# Patient Record
Sex: FEMALE | Race: WHITE | Employment: PART TIME | URBAN - METROPOLITAN AREA
[De-identification: names, ages, dates, MRNs, and addresses within clinical notes are randomized per-mention and may not be internally consistent; named-entity substitution may affect disease eponyms.]

---

## 2018-08-27 ENCOUNTER — TELEPHONE (OUTPATIENT)
Dept: CARDIOLOGY | Facility: CLINIC | Age: 23
End: 2018-08-27

## 2018-08-27 DIAGNOSIS — Q23.81 BICUSPID AORTIC VALVE: Primary | ICD-10-CM

## 2018-08-27 NOTE — TELEPHONE ENCOUNTER
New ACHD/ CV Genetics Patient Intake    1.  Patient's cardiac history:  BAV, Trivial aortic regurgitation, mild aortic stenosis    2.  Previous cardiac procedures (heart catherizations, surgeries): No      3.  Patient's previous cardiologist and when last visit was: Dr. Bland (11/23/2015)    4.  Recent testing (Echo, MRI, CT, Stress tests): No    5.  Any present concerns: Continuation of Care    6.  Closet location for patient to be seen ([facility]): [place]    7.  Any recent testing at the [facility]: 2015    8.  Patient's E mail or Fax number to send CRYSTAL: [email or web address]    9.  Update chart with patient's PCP: No      FUTURE VISIT INFORMATION        FUTURE VISIT INFORMATION:    Date:     Time:     Location:      RECORDS REQUESTED FROM:         Clinic name Comments Records Status Imaging Status                                                          RECORDS STATUS

## 2018-10-09 ENCOUNTER — OFFICE VISIT (OUTPATIENT)
Dept: CARDIOLOGY | Facility: CLINIC | Age: 23
End: 2018-10-09
Attending: INTERNAL MEDICINE
Payer: COMMERCIAL

## 2018-10-09 ENCOUNTER — RADIANT APPOINTMENT (OUTPATIENT)
Dept: CARDIOLOGY | Facility: CLINIC | Age: 23
End: 2018-10-09
Payer: COMMERCIAL

## 2018-10-09 VITALS
BODY MASS INDEX: 23.19 KG/M2 | HEIGHT: 68 IN | DIASTOLIC BLOOD PRESSURE: 110 MMHG | WEIGHT: 153 LBS | SYSTOLIC BLOOD PRESSURE: 186 MMHG

## 2018-10-09 DIAGNOSIS — Q23.81 BICUSPID AORTIC VALVE: ICD-10-CM

## 2018-10-09 DIAGNOSIS — Q23.1 CONGENITAL INSUFFICIENCY OF AORTIC VALVE: Primary | ICD-10-CM

## 2018-10-09 LAB
ALBUMIN SERPL-MCNC: 3.6 G/DL (ref 3.4–5)
ALP SERPL-CCNC: 49 U/L (ref 40–150)
ALT SERPL W P-5'-P-CCNC: 18 U/L (ref 0–50)
ANION GAP SERPL CALCULATED.3IONS-SCNC: 6 MMOL/L (ref 3–14)
AST SERPL W P-5'-P-CCNC: 18 U/L (ref 0–45)
BASOPHILS # BLD AUTO: 0 10E9/L (ref 0–0.2)
BASOPHILS NFR BLD AUTO: 0.3 %
BILIRUB SERPL-MCNC: 0.4 MG/DL (ref 0.2–1.3)
BUN SERPL-MCNC: 11 MG/DL (ref 7–30)
CALCIUM SERPL-MCNC: 8.9 MG/DL (ref 8.5–10.1)
CHLORIDE SERPL-SCNC: 106 MMOL/L (ref 94–109)
CHOLEST SERPL-MCNC: 151 MG/DL
CO2 SERPL-SCNC: 26 MMOL/L (ref 20–32)
CREAT SERPL-MCNC: 0.88 MG/DL (ref 0.52–1.04)
DIFFERENTIAL METHOD BLD: ABNORMAL
EOSINOPHIL # BLD AUTO: 0 10E9/L (ref 0–0.7)
EOSINOPHIL NFR BLD AUTO: 1 %
ERYTHROCYTE [DISTWIDTH] IN BLOOD BY AUTOMATED COUNT: 11.1 % (ref 10–15)
GFR SERPL CREATININE-BSD FRML MDRD: 80 ML/MIN/1.7M2
GLUCOSE SERPL-MCNC: 92 MG/DL (ref 70–99)
HCT VFR BLD AUTO: 36.3 % (ref 35–47)
HDLC SERPL-MCNC: 67 MG/DL
HGB BLD-MCNC: 11.8 G/DL (ref 11.7–15.7)
IMM GRANULOCYTES # BLD: 0 10E9/L (ref 0–0.4)
IMM GRANULOCYTES NFR BLD: 0.3 %
LDLC SERPL CALC-MCNC: 53 MG/DL
LYMPHOCYTES # BLD AUTO: 1.5 10E9/L (ref 0.8–5.3)
LYMPHOCYTES NFR BLD AUTO: 39.6 %
MCH RBC QN AUTO: 32 PG (ref 26.5–33)
MCHC RBC AUTO-ENTMCNC: 32.5 G/DL (ref 31.5–36.5)
MCV RBC AUTO: 98 FL (ref 78–100)
MONOCYTES # BLD AUTO: 0.5 10E9/L (ref 0–1.3)
MONOCYTES NFR BLD AUTO: 11.7 %
NEUTROPHILS # BLD AUTO: 1.8 10E9/L (ref 1.6–8.3)
NEUTROPHILS NFR BLD AUTO: 47.1 %
NONHDLC SERPL-MCNC: 84 MG/DL
NRBC # BLD AUTO: 0 10*3/UL
NRBC BLD AUTO-RTO: 0 /100
PLATELET # BLD AUTO: 235 10E9/L (ref 150–450)
POTASSIUM SERPL-SCNC: 3.9 MMOL/L (ref 3.4–5.3)
PROT SERPL-MCNC: 7 G/DL (ref 6.8–8.8)
RBC # BLD AUTO: 3.69 10E12/L (ref 3.8–5.2)
SODIUM SERPL-SCNC: 139 MMOL/L (ref 133–144)
TRIGL SERPL-MCNC: 158 MG/DL
TSH SERPL DL<=0.005 MIU/L-ACNC: 1.93 MU/L (ref 0.4–4)
WBC # BLD AUTO: 3.8 10E9/L (ref 4–11)

## 2018-10-09 PROCEDURE — G0463 HOSPITAL OUTPT CLINIC VISIT: HCPCS

## 2018-10-09 PROCEDURE — 80053 COMPREHEN METABOLIC PANEL: CPT | Performed by: INTERNAL MEDICINE

## 2018-10-09 PROCEDURE — 84443 ASSAY THYROID STIM HORMONE: CPT | Performed by: INTERNAL MEDICINE

## 2018-10-09 PROCEDURE — 36415 COLL VENOUS BLD VENIPUNCTURE: CPT | Performed by: INTERNAL MEDICINE

## 2018-10-09 PROCEDURE — 85025 COMPLETE CBC W/AUTO DIFF WBC: CPT | Performed by: INTERNAL MEDICINE

## 2018-10-09 PROCEDURE — 99205 OFFICE O/P NEW HI 60 MIN: CPT | Mod: GC | Performed by: INTERNAL MEDICINE

## 2018-10-09 PROCEDURE — 80061 LIPID PANEL: CPT | Performed by: INTERNAL MEDICINE

## 2018-10-09 PROCEDURE — 93010 ELECTROCARDIOGRAM REPORT: CPT | Mod: ZP | Performed by: INTERNAL MEDICINE

## 2018-10-09 ASSESSMENT — PAIN SCALES - GENERAL: PAINLEVEL: NO PAIN (0)

## 2018-10-09 NOTE — NURSING NOTE
Chief Complaint   Patient presents with     Consult     heart problem -- 22 yo female with PMH significant for bicuspid aortic valve with mild aortic stenosis presenting for evaluation     Adolph Cantrell CMA at 12:08 PM on 10/9/2018     Medications and vitals reviewed with patient and confirmed.

## 2018-10-09 NOTE — PROGRESS NOTES
CARDIOLOGY NEW OFFICE VISIT    HPI: Margaret Prieto is a 23 year old female with PMHx of Bicupsid Aortic Valve c/b mild aortic stenosis and aortic regurgitation who presents to [facility] care.     Patient         PAST MEDICAL HISTORY:  Past Medical History:   Diagnosis Date     Congenital insufficiency of aortic valve 6/19/2013       CURRENT MEDICATIONS:  Current Outpatient Prescriptions   Medication Sig Dispense Refill     norgestim-eth estrad triphasic (TRI-PREVIFEM) 0.18/0.215/0.25 MG-35 MCG per tablet Take 1 tablet by mouth daily         PAST SURGICAL HISTORY:  No past surgical history on file.    ALLERGIES  Review of patient's allergies indicates no known allergies.    FAMILY HX:  Family History   Problem Relation Age of Onset     Hyperlipidemia Maternal Grandmother      Hyperlipidemia Maternal Grandfather      Hypertension Paternal Grandmother      Hypertension Paternal Grandfather        SOCIAL HX:  Social History     Social History     Marital status: Single     Spouse name: N/A     Number of children: N/A     Years of education: N/A     Social History Main Topics     Smoking status: Never Smoker     Smokeless tobacco: Never Used     Alcohol use Not on file     Drug use: Not on file     Sexual activity: Not on file     Other Topics Concern     Not on file     Social History Narrative       ROS:  Negative unless stated above     VITAL SIGNS:  There were no vitals taken for this visit.  There is no height or weight on file to calculate BMI.  Wt Readings from Last 2 Encounters:   11/23/15 64 kg (141 lb 1.5 oz)   06/19/13 63 kg (138 lb 14.2 oz) (73 %)*     * Growth percentiles are based on [facility] 2-20 Years data.       PHYSICAL EXAM      LABS    Lab Results   Component Value Date    WBC 3.8 10/09/2018     Lab Results   Component Value Date    RBC 3.69 10/09/2018     Lab Results   Component Value Date    HGB 11.8 10/09/2018     Lab Results   Component Value Date    HCT 36.3 10/09/2018     No components found for:  MCT  Lab Results   Component Value Date    MCV 98 10/09/2018     Lab Results   Component Value Date    MCH 32.0 10/09/2018     Lab Results   Component Value Date    MCHC 32.5 10/09/2018     Lab Results   Component Value Date    RDW 11.1 10/09/2018     Lab Results   Component Value Date     10/09/2018      Recent Labs   Lab Test  10/09/18   0939   NA  139   POTASSIUM  3.9   CHLORIDE  106   CO2  26   ANIONGAP  6   GLC  92   BUN  11   CR  0.88   PERLITA  8.9     Recent Labs   Lab Test  10/09/18   0939   CHOL  151   HDL  67   LDL  53   TRIG  158*   NHDL  84        EKG:  ***    ECHO: In process     ASSESSMENT AND PLAN:  Margaret Prieto is a 23 year old female with PMHx of Bicupsid Aortic Valve c/b mild aortic stenosis and aortic regurgitation who presents to establish care.

## 2018-10-09 NOTE — MR AVS SNAPSHOT
After Visit Summary   10/9/2018    Margaret Prieto    MRN: [id number]           Patient Information     Date Of Birth          1995        Visit Information        Provider Department      10/9/2018 12:30 PM Germaine Lemon MD [facility]        Today's Diagnoses     Congenital insufficiency of aortic valve    -  1      Care Instructions    You were seen today in the Adult Congenital and Cardiovascular Genetics Clinic at the [facility].    Cardiology Providers you saw during your visit:  Dr. Germaine Lemon    Diagnosis:  Bicuspid aortic valve    Results:  The results of your echo were discussed with you today    Recommendations:    1.  Continue to eat a heart healthy, low salt diet.  2.  Continue to get 20-30 minutes of aerobic activity, 4-5 days per week.  Examples of aerobic activity include walking, running, swimming, cycling, etc.  3.  Continue to observe good oral hygiene, with regular dental visits.  4. Please have all first degree relatives screened with an echo to check for bicuspid aortic valve - mother, father, siblings and children. We can facilitate these screenings if desired.  Just call our clinic to schedule.   4.  Please have a cardiac MRI/MRA. We will call you with the results.     Cardiac MRI  Magnetic resonance imaging (MRI) is a test that lets your doctor see detailed pictures of the inside of your body. MRI combines the use of strong magnets and radio waves to form an MRI image. A Cardiac MRI will give a detailed image of your heart.    Follow these instructions:  1. Please no eating or drinking 3 hours prior to the procedure.   2. No caffeine, alcohol or tobacco 12 hours prior to the procedure,    During the procedure:  1.Please arrive to the [facility] Waiting Room in the [facility].   2. You will be required to lay flat and follow breath-hold instructions.   3. You will need to remove all metal and answer a safety questionnaire. Please wear clothes  "without metal (snaps and zippers). Please remove any body piercings and hair extensions before you arrive. You will also remove watches, jewelry, hairpins, wallets, dentures, partial dental plates and hearing aids. You may wear contact lenses, and you may be able to wear your rings. We have a safe place to keep your personal items, but it is safer to leave them at home.  4. You will be given IV contrast for this exam.  To prepare:  The day before the exam drink extra fluid - at least six 8oz glasses (unless you are on a fluid restriction per your doctor)     Vitals:    10/09/18 1207 10/09/18 1237 10/09/18 1238 10/09/18 1239   BP: (!) (P) 135/91 121/73 (!) 188/106 (!) 186/110   BP Location: Right arm Left arm Left leg Right leg   Cuff Size: Adult Regular Adult Regular     Pulse: (P) 75      Resp: (P) 18      SpO2: (P) 99%      Weight: 69.4 kg (153 lb)      Height: 1.727 m (5' 8\")          Exercise restrictions:   Yes__X__  No____         If yes, list restrictions:  Must be allowed to rest if fatigued or SOB      Work restrictions:  Yes____  No____         If yes, list restrictions:    FASTING CHOLESTEROL was checked in the last 5 years YES___  NO___   Continue to eat a heart healthy, low salt diet.         ____ Fasting lipid panel order today         ____ No changes in medications          ____ I recommend the following changes in your cholesterol medications.:          ____ Please follow up for cholesterol screening at your primary care physician      Follow-up:  Follow up with Dr. Lemon 1 year after your MRI with an echo.     If you have questions or concerns please contact us at:    Adrien Lugo RN, BSN   Timothy Lopez (Scheduling)  Nurse Coordinator     Clinic   Adult Congenital and CV Genetics Adult Congenital and CV Genetic  [facility]  (P)[phone]    (P) " [phone]  [phone]        For after hours urgent needs, call [phone] and ask to speak to the Adult Congenital Physician on call.  Mention Job Code 0401.    For emergencies call 911.    [facility]  [facility]  Mail Code [id number]  [address] [place]  [address]           Follow-ups after your visit        Additional Services     Adult Congenital and CV Genetics Clinic       Echo prior (appt should be 1 year after her MRI was completed)                  Your next 10 appointments already scheduled     Nov 30, 2018  8:15 AM CST   MR CARDIAC W CONTRAST W FLOW QUANT with UUMR4, UU CV MR NURSE   [facility], [place], [facility] ([facility])    [address] [facility] [address] [phone]           How do I prepare for my exam? (Food and drink instructions) The day before your exam, drink extra fluids at least six 8-ounce glasses (unless your doctor wants you to limit your fluids). Stop all caffeine 12 hours before the test. This includes coffee, tea, soda, chocolate and certain medicines (such as Anacin, Excedrin and NoDoz). Also avoid decaf coffee and tea, as these contain small amounts of caffeine.  Do not eat or drink for 3 hours before your exam. You may drink water and take your morning medicines.  **If you will be receiving sedation or general anesthesia, please see special notes below.**  How do I prepare for my exam? (Other instructions) Take your medicines as usual, unless your doctor tells you not to. If you take Viagra, Levitra or Cialis, stop taking it 48 hours before your test. If you take Aggrenox or dipyridamole (Persantine, Permole), stop taking it 48 hours before your test. If you take Theophylline or Aminophylline, stop taking it 12 hours before your test. If you are diabetic and take oral hypoglycemics, do not take  them on the day of your test.  You may need a blood test (creatinine test) within 30 days of your exam. Follow your doctor s orders if this is arranged before your exam.  If you are very claustrophobic, please let you doctor know. You may get a sedative medicine from your doctor before you arrive. Bring the medicine to the exam. Do not take it at home. Arrive one hour early. Bring someone who can take you home after the test. Your medicine will make you sleepy. After the exam, you may not drive, take a bus or take a taxi by yourself.  **If you will be receiving sedation or general anesthesia, please see special notes below.**  What should I wear: The MRI machine uses a strong magnet. Please wear clothes without metal (snaps, zippers). A sweatsuit works well, or we may give you a hospital gown. Please remove any body piercings and hair extensions before you arrive. You will remove watches, jewelry, hairpins, wallets, dentures, partial dental plates and hearing aids. You may wear contact lenses, and you may be able to wear your rings. We have a safe place to keep your personal items, but it is safer to leave them at home.  How long does the exam take: Most tests take 30 to 60 minutes.  HOWEVER, IF YOUR DOCTOR PRESCRIBES ANESTHESIA please plan on spending four to five hours in the recovery room.  What should I bring:  Bring a list of your current medicines to your exam (including vitamins, minerals and over-the-counter drugs).  Do I need a : **If you will be receiving sedation or general anesthesia, please see special notes below.**  What should I do after the exam: No Restrictions, You may resume normal activities.  What is this test: MRI (magnetic resonance imaging) uses a strong magnet and radio waves to look inside the body. An MRA (magnetic resonance angiogram) does the same thing, but it lets us look at your blood vessels. A computer turns the radio waves into pictures showing cross sections of the body,  much like slices of bread. This helps us see any problems more clearly. You may receive fluid (called  contrast ) before or during your scan. The fluid helps us see the pictures better. We give the fluid through an IV (small needle in your arm).  Who should I call with questions: If you have any questions, please contact your Imaging Department exam site. Directions, parking instructions, and other information is available on our website, [email or web address].  How do I prepare if I m having sedation or anesthesia? **IMPORTANT** THE INSTRUCTIONS BELOW ARE ONLY FOR THOSE PATIENTS WHO HAVE BEEN TOLD THEY WILL RECEIVE SEDATION OR GENERAL ANESTHESIA DURING THEIR MRI PROCEDURE:  IF YOU WILL RECEIVE SEDATION (take medicine to help you relax during your exam): You must get the medicine from your doctor before you arrive. Bring the medicine to the exam. Do not take it at home. Arrive one hour early. Bring someone who can take you home after the test. Your medicine will make you sleepy. After the exam, you may not drive, take a bus or take a taxi by yourself. No eating 8 hours before your exam. You may have clear liquids up until 4 hours before your exam. (Clear liquids include water, clear tea, black coffee and fruit juice without pulp.)  IF YOU WILL RECEIVE ANESTHESIA (be asleep for your exam): Arrive 1 1/2 hours early. Bring someone who can take you home after the test. You may not drive, take a bus or take a taxi by yourself. No eating 8 hours before your exam. You may have clear liquids up until 4 hours before your exam. (Clear liquids include water, clear tea, black coffee and fruit juice without pulp.            Nov 30, 2018 10:00 AM CST   MRA CHEST WITH CONTRAST with UUMR4   [facility], [place], MRI ([facility] [place])    [address] [id number] [phone]           How do I prepare for my exam? (Food and drink instructions) **If you will be  receiving sedation or general anesthesia, please see special notes below.**  How do I prepare for my exam? (Other instructions) Take your medicines as usual, unless your doctor tells you not to. Please remove any body piercings and hair extensions before you arrive. Follow your doctor s orders. If you do not, we may have to postpone your exam. You may or may not receive IV contrast for this exam pending the discretion of the Radiologist.  You do not need to do anything special to prepare. **If you will be receiving sedation or general anesthesia, please see special notes below.**  What should I wear:  The MRI machine uses a strong magnet. Please wear clothes without metal (snaps, zippers). A sweatsuit works well, or we may give you a hospital gown.  How long does the exam take: Most tests take 30 to 60 minutes.  HOWEVER, IF YOUR DOCTOR PRESCRIBES ANESTHESIA please plan on spending four to five hours in the recovery room.  What should I bring: Bring a list of your current medicines to your exam (including vitamins, minerals and over-the-counter drugs). Also bring the results of similar scans you may have had.  Do I need a : **If you will be receiving sedation or general anesthesia, please see special notes below.**  What should I do after the exam? No Restrictions, You may resume normal activities.  What is this test: MRI (magnetic resonance imaging) uses a strong magnet and radio waves to look inside the body. An MRA (magnetic resonance angiogram) does the same thing, but it lets us look at your blood vessels. A computer turns the radio waves into pictures showing cross sections of the body, much like slices of bread. This helps us see any problems more clearly.  Who should I call with questions: Please call the Imaging Department at your exam site with any questions. Directions, parking instructions, and other information is available on our website, [email or web address].  How do I prepare if I m having  sedation or anesthesia? **IMPORTANT** THE INSTRUCTIONS BELOW ARE ONLY FOR THOSE PATIENTS WHO HAVE BEEN TOLD THEY WILL RECEIVE SEDATION OR GENERAL ANESTHESIA DURING THEIR MRI PROCEDURE:  IF YOU WILL RECEIVE SEDATION (take medicine to help you relax during your exam): You must get the medicine from your doctor before you arrive. Bring the medicine to the exam. Do not take it at home. Arrive one hour early. Bring someone who can take you home after the test. Your medicine will make you sleepy. After the exam, you may not drive, take a bus or take a taxi by yourself. No eating 8 hours before your exam. You may have clear liquids up until 4 hours before your exam. (Clear liquids include water, clear tea, black coffee and fruit juice without pulp.)  IF YOU WILL RECEIVE ANESTHESIA (be asleep for your exam): Arrive 1 1/2 hours early. Bring someone who can take you home after the test. You may not drive, take a bus or take a taxi by yourself. No eating 8 hours before your exam. You may have clear liquids up until 4 hours before your exam. (Clear liquids include water, clear tea, black coffee and fruit juice without pulp.) You will spend four to five hours in the recovery room.              Future tests that were ordered for you today     Open Future Orders        Priority Expected Expires Ordered    Adult Congenital and CV Genetics Clinic Routine 10/9/2019 10/9/2019 10/9/2018    Echo Complete Routine 10/9/2019 10/10/2019 10/9/2018    MRI Cardiac w/contrast and flow Routine 11/16/2018 10/10/2019 10/9/2018    MRA Chest with Contrast Routine 11/16/2018 10/10/2019 10/9/2018            Who to contact     If you have questions or need follow up information about today's clinic visit or your schedule please contact [facility] directly at [phone].  Normal or non-critical lab and imaging results will be communicated to you by MyChart, letter or phone within 4 business days after the clinic has received the results. If  "you do not hear from us within 7 days, please contact the clinic through [employer] or phone. If you have a critical or abnormal lab result, we will notify you by phone as soon as possible.  Submit refill requests through [employer] or call your pharmacy and they will forward the refill request to us. Please allow 3 business days for your refill to be completed.          Additional Information About Your Visit        [employer] Information     [employer] gives you secure access to your electronic health record. If you see a primary care provider, you can also send messages to your care team and make appointments. If you have questions, please call your primary care clinic.  If you do not have a primary care provider, please call [phone] and they will assist you.        Care EveryWhere ID     This is your Care EveryWhere ID. This could be used by other organizations to access your [place] medical records  [id number]        Your Vitals Were     Height BMI (Body Mass Index)                1.727 m (5' 8\") 23.26 kg/m2           Blood Pressure from Last 3 Encounters:   10/09/18 (!) 186/110   11/23/15 118/67   06/19/13 118/63    Weight from Last 3 Encounters:   10/09/18 69.4 kg (153 lb)   11/23/15 64 kg (141 lb 1.5 oz)   06/19/13 63 kg (138 lb 14.2 oz) (73 %)*     * Growth percentiles are based on CDC 2-20 Years data.              We Performed the Following     EKG 12-lead, tracing only (Same Day)        Primary Care Provider Office Phone # Fax #    Wiley Verma [phone]       [facility] [address] [facility] [address]  [facility] [address]        Equal Access to Services     [facility] CenterVERONIKA : Hadii zofia Torres, waaxda luqminnie, qaybta kaalarti valentin. So [facility] [phone].    ATENCIÓN: Si habla español, tiene a david disposición servicios gratuitos de asistencia lingüística. Llame al [phone].    We comply with applicable federal civil " rights laws and [place] laws. We do not discriminate on the basis of race, color, national origin, age, disability, sex, sexual orientation, or gender identity.            Thank you!     Thank you for choosing [facility]  for your care. Our goal is always to provide you with excellent care. Hearing back from our patients is one way we can continue to improve our services. Please take a few minutes to complete the written survey that you may receive in the mail after your visit with us. Thank you!             Your Updated Medication List - Protect others around you: Learn how to safely use, store and throw away your medicines at www.disposemymeds.org.          This list is accurate as of 10/9/18  1:22 PM.  Always use your most recent med list.                   Brand Name Dispense Instructions for use Diagnosis    TRI-PREVIFEM 0.18/0.215/0.25 MG-35 MCG per tablet   Generic drug:  norgestim-eth estrad triphasic      Take 1 tablet by mouth daily

## 2018-10-09 NOTE — PATIENT INSTRUCTIONS
You were seen today in the Adult Congenital and Cardiovascular Genetics Clinic at the [facility].    Cardiology Providers you saw during your visit:  Dr. Germaine Lemon    Diagnosis:  Bicuspid aortic valve    Results:  The results of your echo were discussed with you today    Recommendations:    1.  Continue to eat a heart healthy, low salt diet.  2.  Continue to get 20-30 minutes of aerobic activity, 4-5 days per week.  Examples of aerobic activity include walking, running, swimming, cycling, etc.  3.  Continue to observe good oral hygiene, with regular dental visits.  4. Please have all first degree relatives screened with an echo to check for bicuspid aortic valve - mother, father, siblings and children. We can facilitate these screenings if desired.  Just call our clinic to schedule.   4.  Please have a cardiac MRI/MRA. We will call you with the results.     Cardiac MRI  Magnetic resonance imaging (MRI) is a test that lets your doctor see detailed pictures of the inside of your body. MRI combines the use of strong magnets and radio waves to form an MRI image. A Cardiac MRI will give a detailed image of your heart.    Follow these instructions:  1. Please no eating or drinking 3 hours prior to the procedure.   2. No caffeine, alcohol or tobacco 12 hours prior to the procedure,    During the procedure:  1.Please arrive to the [facility] Waiting Room in the [facility].   2. You will be required to lay flat and follow breath-hold instructions.   3. You will need to remove all metal and answer a safety questionnaire. Please wear clothes without metal (snaps and zippers). Please remove any body piercings and hair extensions before you arrive. You will also remove watches, jewelry, hairpins, wallets, dentures, partial dental plates and hearing aids. You may wear contact lenses, and you may be able to wear your rings. We have a safe place to keep your personal items, but it is safer to leave them at  "home.  4. You will be given IV contrast for this exam.  To prepare:  The day before the exam drink extra fluid - at least six 8oz glasses (unless you are on a fluid restriction per your doctor)     Vitals:    10/09/18 1207 10/09/18 1237 10/09/18 1238 10/09/18 1239   BP: (!) (P) 135/91 121/73 (!) 188/106 (!) 186/110   BP Location: Right arm Left arm Left leg Right leg   Cuff Size: Adult Regular Adult Regular     Pulse: (P) 75      Resp: (P) 18      SpO2: (P) 99%      Weight: 69.4 kg (153 lb)      Height: 1.727 m (5' 8\")          Exercise restrictions:   Yes__X__  No____         If yes, list restrictions:  Must be allowed to rest if fatigued or SOB      Work restrictions:  Yes____  No____         If yes, list restrictions:    FASTING CHOLESTEROL was checked in the last 5 years YES___  NO___   Continue to eat a heart healthy, low salt diet.         ____ Fasting lipid panel order today         ____ No changes in medications          ____ I recommend the following changes in your cholesterol medications.:          ____ Please follow up for cholesterol screening at your primary care physician      Follow-up:  Follow up with Dr. Lemon 1 year after your MRI with an echo.     If you have questions or concerns please contact us at:    Adrien Lugo, RN, BSN   Timothy Lopez (Scheduling)  Nurse Coordinator     Clinic   Adult Congenital and CV Genetics Adult Congenital and CV Genetic  [facility]  [phone])[phone]    (P) [phone]  [email or web address] (F)[phone]        For after hours urgent needs, call [phone] and ask to speak to the Adult Congenital Physician on call.  Mention Job Code 0401.    For emergencies call 911.    [facility]  [facility]   Clin[facility] and [facility] Center  Mail Code [id number]  [address] [place] [address]  [id number]   "

## 2018-10-09 NOTE — LETTER
10/9/2018    RE: Margaret Prieto  [address]  [facility] [address]     Dear Colleague,    Thank you for the opportunity to participate in the care of your patient, Margaret Prieto, at the [facility] acute hospital. Please see a copy of my visit note below.             Vascular Cardiology Consultation      HPI: Margaret Prieto is a 23 year old female with PMHx of Bicupsid Aortic Valve c/b mild aortic regurgitation who presents to [facility] care.     Patient denies chest pain, shortness of breath, LH, dizziness, syncope, claudication. Patient does not have any exercise limitations. Patient is able to climb multiple flights of stairs without any major issues.     Social History: Patient is a  at [employer].Denies smoking or illicit substances. Patient does report some social drinking.     Family History: Mother is healthy. Dad is healthy. Patient has two siblings (24 and 18 years old). They do not have any cardiac issues. No history of sudden cardiac death. Denies any family history of aortic valve or aortopathies.     Left Arm: 121/73   Right Le/110        PAST MEDICAL HISTORY:  Past Medical History:   Diagnosis Date     Congenital insufficiency of aortic valve 2013       CURRENT MEDICATIONS:  Current Outpatient Prescriptions   Medication Sig Dispense Refill     norgestim-eth estrad triphasic (TRI-PREVIFEM) 0.18/0.215/0.25 MG-35 MCG per tablet Take 1 tablet by mouth daily         PAST SURGICAL HISTORY:  No past surgical history on file.    ALLERGIES  Review of patient's allergies indicates no known allergies.    FAMILY HX:  Family History   Problem Relation Age of Onset     Hyperlipidemia Maternal Grandmother      Hyperlipidemia Maternal Grandfather      Hypertension Paternal Grandmother      Hypertension Paternal Grandfather        SOCIAL HX:  Social History     Social History     Marital status: Single     Spouse name: N/A     Number of  children: N/A     Years of education: N/A     Social History Main Topics     Smoking status: Never Smoker     Smokeless tobacco: Never Used     Alcohol use Not on file     Drug use: Not on file     Sexual activity: Not on file     Other Topics Concern     Not on file     Social History Narrative       ROS:  Negative unless stated above     VITAL SIGNS:  There were no vitals taken for this visit.  There is no height or weight on file to calculate BMI.  Wt Readings from Last 2 Encounters:   11/23/15 64 kg (141 lb 1.5 oz)   06/19/13 63 kg (138 lb 14.2 oz) (73 %)*     * Growth percentiles are based on [facility] 2-20 Years data.       PHYSICAL EXAM      BP: (!) 186/110 Pulse: (P) 75   Resp: (P) 18 SpO2: (P) 99 %      Gen: NAD   HEENT: NC/AT   CV: RRR, +3/6 narayan, No Carotid Bruits   Pulm: CTAB   GI: s/nt/nd   Ext: No edema. 2+ DP Pulses Bilaterally   Neuro: No focal defects     LABS    Lab Results   Component Value Date    WBC 3.8 10/09/2018     Lab Results   Component Value Date    RBC 3.69 10/09/2018     Lab Results   Component Value Date    HGB 11.8 10/09/2018     Lab Results   Component Value Date    HCT 36.3 10/09/2018     No components found for: MCT  Lab Results   Component Value Date    MCV 98 10/09/2018     Lab Results   Component Value Date    MCH 32.0 10/09/2018     Lab Results   Component Value Date    MCHC 32.5 10/09/2018     Lab Results   Component Value Date    RDW 11.1 10/09/2018     Lab Results   Component Value Date     10/09/2018      Recent Labs   Lab Test  10/09/18   0939   NA  139   POTASSIUM  3.9   CHLORIDE  106   CO2  26   ANIONGAP  6   GLC  92   BUN  11   CR  0.88   PERLITA  8.9     Recent Labs   Lab Test  10/09/18   0939   CHOL  151   HDL  67   LDL  53   TRIG  158*   NHDL  84         EKG:  Normal Sinus Rhythm    ECHO: In process     ASSESSMENT AND PLAN:    Margaret Prieto is a 23 year old female with PMHx of Bicupsid Aortic Valve c/b mild aortic stenosis and aortic regurgitation who presents to  establish care.     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 Patient has no cardiopulmonary symptoms.   -Her aortic size looked normal on ECHO. However, there is some evidence of increased velocities in the descending aorta. Therefore, she could have a coarctation as well which can be seen in bicupsid aortic valves. Do not suspect a significant gradient as no drop in pressures from arm to leg.  -Therefore, would recommend Cardiac MRI and MRA to assess aortic size to ensure no concomitant aortopathy and assess for coarctation.    -After MRI/MRA, she will need yearly ECHO's to assess AR and aortic stenosis.  -First degree family members should be screened with ECHO.    -Patient does not need any antibiotic prophylaxis at this time for her bicuspid aortic valve.     Elizabeth Jones   Cardiology Fellow   Pager: [phone]    ATTENDING ATTESTATION    Patient was seen and evaluated in clinic today with the cardiology fellow. The note has been edited to reflect the history taking performed by me, my personal review of imaging, EKGs, labs and procedures, and our joint assessment and plan.     Total time spent 60 minutes, of which >50% was spent in face-to-face patient evaluation, reviewing data with patient, prolonged counseling of lifestyle modifications, any necessary genetic testing and screening of family members, and coordination of care.       Germaine Lemon MD MSc    Division of Cardiology  Vascular Section  [facility]

## 2018-10-09 NOTE — PROGRESS NOTES
Vascular Cardiology Consultation      HPI: Margaret Prieto is a 23 year old female with PMHx of Bicupsid Aortic Valve c/b mild aortic regurgitation who presents to [facility] care.     Patient denies chest pain, shortness of breath, LH, dizziness, syncope, claudication. Patient does not have any exercise limitations. Patient is able to climb multiple flights of stairs without any major issues.     Social History: Patient is a  at [employer].Denies smoking or illicit substances. Patient does report some social drinking.     Family History: Mother is healthy. Dad is healthy. Patient has two siblings (24 and 18 years old). They do not have any cardiac issues. No history of sudden cardiac death. Denies any family history of aortic valve or aortopathies.     Left Arm: 121/73   Right Le/110        PAST MEDICAL HISTORY:  Past Medical History:   Diagnosis Date     Congenital insufficiency of aortic valve 2013       CURRENT MEDICATIONS:  Current Outpatient Prescriptions   Medication Sig Dispense Refill     norgestim-eth estrad triphasic (TRI-PREVIFEM) 0.18/0.215/0.25 MG-35 MCG per tablet Take 1 tablet by mouth daily         PAST SURGICAL HISTORY:  No past surgical history on file.    ALLERGIES  Review of patient's allergies indicates no known allergies.    FAMILY HX:  Family History   Problem Relation Age of Onset     Hyperlipidemia Maternal Grandmother      Hyperlipidemia Maternal Grandfather      Hypertension Paternal Grandmother      Hypertension Paternal Grandfather        SOCIAL HX:  Social History     Social History     Marital status: Single     Spouse name: N/A     Number of children: N/A     Years of education: N/A     Social History Main Topics     Smoking status: Never Smoker     Smokeless tobacco: Never Used     Alcohol use Not on file     Drug use: Not on file     Sexual activity: Not on file     Other Topics Concern     Not on file     Social History Narrative        ROS:  Negative unless stated above     VITAL SIGNS:  There were no vitals taken for this visit.  There is no height or weight on file to calculate BMI.  Wt Readings from Last 2 Encounters:   11/23/15 64 kg (141 lb 1.5 oz)   06/19/13 63 kg (138 lb 14.2 oz) (73 %)*     * Growth percentiles are based on [facility] 2-20 Years data.       PHYSICAL EXAM      BP: (!) 186/110 Pulse: (P) 75   Resp: (P) 18 SpO2: (P) 99 %      Gen: NAD   HEENT: NC/AT   CV: RRR, +3/6 narayan, No Carotid Bruits   Pulm: CTAB   GI: s/nt/nd   Ext: No edema. 2+ DP Pulses Bilaterally   Neuro: No focal defects     LABS    Lab Results   Component Value Date    WBC 3.8 10/09/2018     Lab Results   Component Value Date    RBC 3.69 10/09/2018     Lab Results   Component Value Date    HGB 11.8 10/09/2018     Lab Results   Component Value Date    HCT 36.3 10/09/2018     No components found for: MCT  Lab Results   Component Value Date    MCV 98 10/09/2018     Lab Results   Component Value Date    MCH 32.0 10/09/2018     Lab Results   Component Value Date    MCHC 32.5 10/09/2018     Lab Results   Component Value Date    RDW 11.1 10/09/2018     Lab Results   Component Value Date     10/09/2018      Recent Labs   Lab Test  10/09/18   0939   NA  139   POTASSIUM  3.9   CHLORIDE  106   CO2  26   ANIONGAP  6   GLC  92   BUN  11   CR  0.88   PERLITA  8.9     Recent Labs   Lab Test  10/09/18   0939   CHOL  151   HDL  67   LDL  53   TRIG  158*   NHDL  84         EKG:  Normal Sinus Rhythm    ECHO: In process     ASSESSMENT AND PLAN:    Margaret Prieto is a 23 year old female with PMHx of Bicupsid Aortic Valve c/b mild aortic stenosis and aortic regurgitation who presents to establish care.     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 Patient has no cardiopulmonary symptoms.   -Her aortic size looked normal on ECHO. However, there is some evidence of increased velocities in the descending  aorta. Therefore, she could have a coarctation as well which can be seen in bicupsid aortic valves. Do not suspect a significant gradient as no drop in pressures from arm to leg.  -Therefore, would recommend Cardiac MRI and MRA to assess aortic size to ensure no concomitant aortopathy and assess for coarctation.    -After MRI/MRA, she will need yearly ECHO's to assess AR and aortic stenosis.  -First degree family members should be screened with ECHO.    -Patient does not need any antibiotic prophylaxis at this time for her bicuspid aortic valve.     Elizabeth Jones   Cardiology Fellow   Pager: [phone]    ATTENDING ATTESTATION    Patient was seen and evaluated in clinic today with the cardiology fellow. The note has been edited to reflect the history taking performed by me, my personal review of imaging, EKGs, labs and procedures, and our joint assessment and plan.     Total time spent 60 minutes, of which >50% was spent in face-to-face patient evaluation, reviewing data with patient, prolonged counseling of lifestyle modifications, any necessary genetic testing and screening of family members, and coordination of care.       Germaine Lemon MD MSc    Division of Cardiology  Vascular Section  [facility]

## 2018-10-11 LAB — INTERPRETATION ECG - MUSE: NORMAL

## 2018-10-14 ENCOUNTER — HEALTH MAINTENANCE LETTER (OUTPATIENT)
Age: 23
End: 2018-10-14

## 2018-11-30 ENCOUNTER — HOSPITAL ENCOUNTER (OUTPATIENT)
Dept: MRI IMAGING | Facility: CLINIC | Age: 23
End: 2018-11-30
Attending: INTERNAL MEDICINE
Payer: COMMERCIAL

## 2018-11-30 ENCOUNTER — HOSPITAL ENCOUNTER (OUTPATIENT)
Dept: MRI IMAGING | Facility: CLINIC | Age: 23
Discharge: HOME | End: 2018-11-30
Attending: INTERNAL MEDICINE | Admitting: INTERNAL MEDICINE
Payer: COMMERCIAL

## 2018-11-30 DIAGNOSIS — Q23.1 CONGENITAL INSUFFICIENCY OF AORTIC VALVE: ICD-10-CM

## 2018-11-30 PROCEDURE — 25500064 ZZH RX 255 OP 636: Performed by: INTERNAL MEDICINE

## 2018-11-30 PROCEDURE — 71555 MRI ANGIO CHEST W OR W/O DYE: CPT | Mod: 26 | Performed by: INTERNAL MEDICINE

## 2018-11-30 PROCEDURE — 75565 CARD MRI VELOC FLOW MAPPING: CPT

## 2018-11-30 PROCEDURE — 71555 MRI ANGIO CHEST W OR W/O DYE: CPT

## 2018-11-30 PROCEDURE — 75565 CARD MRI VELOC FLOW MAPPING: CPT | Mod: 26 | Performed by: INTERNAL MEDICINE

## 2018-11-30 PROCEDURE — A9585 GADOBUTROL INJECTION: HCPCS | Performed by: INTERNAL MEDICINE

## 2018-11-30 PROCEDURE — 75561 CARDIAC MRI FOR MORPH W/DYE: CPT | Mod: 26 | Performed by: INTERNAL MEDICINE

## 2018-11-30 RX ORDER — GADOBUTROL 604.72 MG/ML
10 INJECTION INTRAVENOUS ONCE
Status: COMPLETED | OUTPATIENT
Start: 2018-11-30 | End: 2018-11-30

## 2018-11-30 RX ADMIN — GADOBUTROL 9 ML: 604.72 INJECTION INTRAVENOUS at 10:38

## 2019-11-06 ENCOUNTER — HEALTH MAINTENANCE LETTER (OUTPATIENT)
Age: 24
End: 2019-11-06

## 2020-09-10 DIAGNOSIS — Q23.1 CONGENITAL INSUFFICIENCY OF AORTIC VALVE: Primary | ICD-10-CM

## 2020-09-17 ENCOUNTER — TELEPHONE (OUTPATIENT)
Dept: CARDIOLOGY | Facility: CLINIC | Age: 25
End: 2020-09-17

## 2020-09-18 ENCOUNTER — HOSPITAL ENCOUNTER (OUTPATIENT)
Dept: CARDIOLOGY | Facility: CLINIC | Age: 25
Discharge: HOME | End: 2020-09-18
Attending: INTERNAL MEDICINE | Admitting: INTERNAL MEDICINE
Payer: COMMERCIAL

## 2020-09-18 DIAGNOSIS — Q23.1 CONGENITAL INSUFFICIENCY OF AORTIC VALVE: ICD-10-CM

## 2020-09-18 PROCEDURE — 93306 TTE W/DOPPLER COMPLETE: CPT | Mod: 26 | Performed by: INTERNAL MEDICINE

## 2020-09-18 PROCEDURE — 93306 TTE W/DOPPLER COMPLETE: CPT

## 2020-10-20 ENCOUNTER — VIRTUAL VISIT (OUTPATIENT)
Dept: CARDIOLOGY | Facility: CLINIC | Age: 25
End: 2020-10-20
Payer: COMMERCIAL

## 2020-10-20 DIAGNOSIS — Q23.1 CONGENITAL INSUFFICIENCY OF AORTIC VALVE: Primary | ICD-10-CM

## 2020-10-20 PROCEDURE — 99213 OFFICE O/P EST LOW 20 MIN: CPT | Mod: 95 | Performed by: INTERNAL MEDICINE

## 2020-10-20 NOTE — LETTER
"10/20/2020    Wiley Verma MD  [facility] [address] [address]    RE: Margaret Prieto       Dear Colleague,    I had the pleasure of seeing Margaret Prieto in the [facility].    Margaret Prieto is a 25 year old female who is being evaluated via a billable video visit.      The patient has been notified of following:     \"This video visit will be conducted via a call between you and your physician/provider. We have found that certain health care needs can be provided without the need for an in-person physical exam.  This service lets us provide the care you need with a video conversation.  If a prescription is necessary we can send it directly to your pharmacy.  If lab work is needed we can place an order for that and you can then stop by our lab to have the test done at a later time.    Video visits are billed at different rates depending on your insurance coverage.  Please reach out to your insurance provider with any questions.    If during the course of the call the physician/provider feels a video visit is not appropriate, you will not be charged for this service.\"    Patient has given verbal consent for Video visit? Yes  How would you like to obtain your AVS? MyChart  If you are dropped from the video visit, the video invite should be resent to: Text to cell phone: [phone]  Will anyone else be joining your video visit? No      Vitals - Patient Reported 10/20/2020   Height (Patient Reported) 5' 9\"   Weight (Patient Reported) 145 lb   BMI (Based on Pt Reported Ht/Wt) 21.41 kg/m2   Systolic (Patient Reported) (No Data)       Review Of Systems  Skin: NEGATIVE  Eyes:Ears/Nose/Throat: Glasses  Respiratory: NEGATIVE  Cardiovascular:NEGATIVE  Gastrointestinal: NEGATIVE  Genitourinary:NEGATIVE   Musculoskeletal: NEGATIVE  Neurologic: NEGATIVE  Psychiatric: NEGATIVE  Hematologic/Lymphatic/Immunologic: NEGATIVE  Endocrine:  " NEGATIVE    Marylin Rosales [facility]    PROVIDER NOTES:    Margaret Prieto is a 25 year old female with PMHx of Bicupsid Aortic Valve c/b mild aortic regurgitation who presents for follow up. She had no aortic aneurysm by initial CMR and chest MRA. Patient denies chest pain, shortness of breath, LH, dizziness, syncope, claudication. Patient does not have any exercise limitations. Patient is able to climb multiple flights of stairs without any major issues.      Social History:  in [place]. She Denies smoking or illicit substances. Patient does report some social drinking.      Family History: Mother is healthy. Dad has AML newly diagnosed but treatable. Patient has two siblings. They do not have any cardiac issues. No history of sudden cardiac death. Denies any family history of aortic valve or aortopathies.      Arm and leg pressures last visit were as follows:  Left Arm: 121/73, Right Le/110     She got  in , small wedding.     ASSESSMENT/PLAN:    Margaret Prieto is a 25 year old female with PMHx of Bicupsid Aortic Valve with aortic regurgitation who presents for follow up.     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 Patient has no cardiopulmonary symptoms.   -However, there is some evidence of increased velocities in the descending aorta. MRA was without coarctation.   -MRA confirmed aortic size of 3.4 cm which is stable by echocardiogram this month  -First degree family members should be screened with ECHO.    -Patient does not need any antibiotic prophylaxis at this time for her bicuspid aortic valve.     Follow up in 2 years for repeat echocardiogram.     Germaine Lemon MD MSc  [facility] [facility]     Total time spent 11 minutes.        No orders of the defined types were placed in this encounter.    Orders Placed This Encounter   Medications     etonogestrel (NEXPLANON) 68 MG IMPL     Si each by  Subdermal route once     There are no discontinued medications.      No diagnosis found.    CURRENT MEDICATIONS:  Current Outpatient Medications   Medication Sig Dispense Refill     etonogestrel (NEXPLANON) 68 MG IMPL 1 each by Subdermal route once       norgestim-eth estrad triphasic (TRI-PREVIFEM) 0.18/0.215/0.25 MG-35 MCG per tablet Take 1 tablet by mouth daily         ALLERGIES   No Known Allergies    PAST MEDICAL HISTORY:  Past Medical History:   Diagnosis Date     Congenital insufficiency of aortic valve 6/19/2013       PAST SURGICAL HISTORY:  No past surgical history on file.    FAMILY HISTORY:  Family History   Problem Relation Age of Onset     Hyperlipidemia Maternal Grandmother      Hyperlipidemia Maternal Grandfather      Hypertension Paternal Grandmother      Hypertension Paternal Grandfather        SOCIAL HISTORY:  Social History     Socioeconomic History     Marital status:      Spouse name: Not on file     Number of children: Not on file     Years of education: Not on file     Highest education level: Not on file   Occupational History     Not on file   Social Needs     Financial resource strain: Not on file     Food insecurity     Worry: Not on file     Inability: Not on file     Transportation needs     Medical: Not on file     Non-medical: Not on file   Tobacco Use     Smoking status: Never Smoker     Smokeless tobacco: Never Used   Substance and Sexual Activity     Alcohol use: Not on file     Drug use: Not on file     Sexual activity: Not on file   Lifestyle     Physical activity     Days per week: Not on file     Minutes per session: Not on file     Stress: Not on file   Relationships     Social connections     Talks on phone: Not on file     Gets together: Not on file     Attends [employer] service: Not on file     Active member of club or organization: Not on file     Attends meetings of clubs or organizations: Not on file     Relationship status: Not on file     Intimate partner  violence     Fear of current or ex partner: Not on file     Emotionally abused: Not on file     Physically abused: Not on file     Forced sexual activity: Not on file   Other Topics Concern     Parent/sibling w/ CABG, MI or angioplasty before 65F 55M? Not Asked   Social History Narrative     Not on file       Physical Exam:  Vitals: There were no vitals taken for this visit.  GENERAL: healthy, alert and no distress  EYES: Eyes grossly normal to inspection, conjunctivae and sclerae normal  RESP: no audible wheeze, cough, or visible cyanosis.  No visible retractions or increased work of breathing.  Able to speak fully in complete sentences  NEURO: Cranial nerves grossly intact, mentation intact and speech normal  PSYCH: mentation appears normal, affect normal/bright, judgement and insight intact, normal speech and appearance well-groomed  CARDIOVASCULAR: Neck veins not appreciated indicating probable normal JVP. No LE edema. Normal skin appearance, no stasis dermatitis.     Recent Lab Results:  LIPID RESULTS:  Lab Results   Component Value Date    CHOL 151 10/09/2018    HDL 67 10/09/2018    LDL 53 10/09/2018    TRIG 158 (H) 10/09/2018       LIVER ENZYME RESULTS:  Lab Results   Component Value Date    AST 18 10/09/2018    ALT 18 10/09/2018       CBC RESULTS:  Lab Results   Component Value Date    WBC 3.8 (L) 10/09/2018    RBC 3.69 (L) 10/09/2018    HGB 11.8 10/09/2018    HCT 36.3 10/09/2018    MCV 98 10/09/2018    MCH 32.0 10/09/2018    MCHC 32.5 10/09/2018    RDW 11.1 10/09/2018     10/09/2018       BMP RESULTS:  Lab Results   Component Value Date     10/09/2018    POTASSIUM 3.9 10/09/2018    CHLORIDE 106 10/09/2018    CO2 26 10/09/2018    ANIONGAP 6 10/09/2018    GLC 92 10/09/2018    BUN 11 10/09/2018    CR 0.88 10/09/2018    GFRESTIMATED 80 10/09/2018    GFRESTBLACK >90 10/09/2018    PERLITA 8.9 10/09/2018        A1C RESULTS:  No results found for: A1C    INR RESULTS:  No results found for:  INR      Video-Visit Details    Type of service:  Video Visit    Video Time: 12 minutes    Originating Location (pt. Location): home     Distant Location (provider location):  [facility] [place]     Platform used for Video Visit: Astrid      Thank you for allowing me to participate in the care of your patient.    Sincerely,     Germaine Lemon MD     [facility]

## 2020-10-20 NOTE — PROGRESS NOTES
"         Vascular Cardiology Consultation      Margaret Prieto is a 25 year old female who is being evaluated via a billable video visit.      The patient has been notified of following:     \"This video visit will be conducted via a call between you and your physician/provider. We have found that certain health care needs can be provided without the need for an in-person physical exam.  This service lets us provide the care you need with a video conversation.  If a prescription is necessary we can send it directly to your pharmacy.  If lab work is needed we can place an order for that and you can then stop by our lab to have the test done at a later time.    Video visits are billed at different rates depending on your insurance coverage.  Please reach out to your insurance provider with any questions.    If during the course of the call the physician/provider feels a video visit is not appropriate, you will not be charged for this service.\"    Patient has given verbal consent for Video visit? Yes  How would you like to obtain your AVS? MyChart  If you are dropped from the video visit, the video invite should be resent to: Text to cell phone: [phone]  Will anyone else be joining your video visit? No      Vitals - Patient Reported 10/20/2020   Height (Patient Reported) 5' 9\"   Weight (Patient Reported) 145 lb   BMI (Based on Pt Reported Ht/Wt) 21.41 kg/m2   Systolic (Patient Reported) (No Data)         Review Of Systems  Skin: NEGATIVE  Eyes:Ears/Nose/Throat: Glasses  Respiratory: NEGATIVE  Cardiovascular:NEGATIVE  Gastrointestinal: NEGATIVE  Genitourinary:NEGATIVE   Musculoskeletal: NEGATIVE  Neurologic: NEGATIVE  Psychiatric: NEGATIVE  Hematologic/Lymphatic/Immunologic: NEGATIVE  Endocrine:  NEGATIVE    Marylin Rosales [facility]    PROVIDER NOTES:    Margaret Prieto is a 25 year old female with PMHx of Bicupsid Aortic Valve c/b mild aortic regurgitation who presents for follow up. She had no aortic aneurysm by " initial CMR and chest MRA. Patient denies chest pain, shortness of breath, LH, dizziness, syncope, claudication. Patient does not have any exercise limitations. Patient is able to climb multiple flights of stairs without any major issues.      Social History:  in [place]. She Denies smoking or illicit substances. Patient does report some social drinking.      Family History: Mother is healthy. Dad has AML newly diagnosed but treatable. Patient has two siblings. They do not have any cardiac issues. No history of sudden cardiac death. Denies any family history of aortic valve or aortopathies.      Arm and leg pressures last visit were as follows:  Left Arm: 121/73, Right Le/110     She got  in , small wedding.     ASSESSMENT/PLAN:    Margaret Prieto is a 25 year old female with PMHx of Bicupsid Aortic Valve with aortic regurgitation who presents for follow up.     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 Patient has no cardiopulmonary symptoms.   -However, there is some evidence of increased velocities in the descending aorta. MRA was without coarctation.   -MRA confirmed aortic size of 3.4 cm which is stable by echocardiogram this month  -First degree family members should be screened with ECHO.    -Patient does not need any antibiotic prophylaxis at this time for her bicuspid aortic valve.     Follow up in 2 years for repeat echocardiogram.     Germaine Lemon MD [facility]     Total time spent 11 minutes.        No orders of the defined types were placed in this encounter.    Orders Placed This Encounter   Medications     etonogestrel (NEXPLANON) 68 MG IMPL     Si each by Subdermal route once     There are no discontinued medications.      No diagnosis found.    CURRENT MEDICATIONS:  Current Outpatient Medications   Medication Sig Dispense Refill     etonogestrel (NEXPLANON) 68 MG  IMPL 1 each by Subdermal route once       norgestim-eth estrad triphasic (TRI-PREVIFEM) 0.18/0.215/0.25 MG-35 MCG per tablet Take 1 tablet by mouth daily         ALLERGIES   No Known Allergies    PAST MEDICAL HISTORY:  Past Medical History:   Diagnosis Date     Congenital insufficiency of aortic valve 6/19/2013       PAST SURGICAL HISTORY:  No past surgical history on file.    FAMILY HISTORY:  Family History   Problem Relation Age of Onset     Hyperlipidemia Maternal Grandmother      Hyperlipidemia Maternal Grandfather      Hypertension Paternal Grandmother      Hypertension Paternal Grandfather        SOCIAL HISTORY:  Social History     Socioeconomic History     Marital status:      Spouse name: Not on file     Number of children: Not on file     Years of education: Not on file     Highest education level: Not on file   Occupational History     Not on file   Social Needs     Financial resource strain: Not on file     Food insecurity     Worry: Not on file     Inability: Not on file     Transportation needs     Medical: Not on file     Non-medical: Not on file   Tobacco Use     Smoking status: Never Smoker     Smokeless tobacco: Never Used   Substance and Sexual Activity     Alcohol use: Not on file     Drug use: Not on file     Sexual activity: Not on file   Lifestyle     Physical activity     Days per week: Not on file     Minutes per session: Not on file     Stress: Not on file   Relationships     Social connections     Talks on phone: Not on file     Gets together: Not on file     Attends [employer] service: Not on file     Active member of club or organization: Not on file     Attends meetings of clubs or organizations: Not on file     Relationship status: Not on file     Intimate partner violence     Fear of current or ex partner: Not on file     Emotionally abused: Not on file     Physically abused: Not on file     Forced sexual activity: Not on file   Other Topics Concern     Parent/sibling w/ CABG, MI  or angioplasty before 65F 55M? Not Asked   Social History Narrative     Not on file       Physical Exam:  Vitals: There were no vitals taken for this visit.  GENERAL: healthy, alert and no distress  EYES: Eyes grossly normal to inspection, conjunctivae and sclerae normal  RESP: no audible wheeze, cough, or visible cyanosis.  No visible retractions or increased work of breathing.  Able to speak fully in complete sentences  NEURO: Cranial nerves grossly intact, mentation intact and speech normal  PSYCH: mentation appears normal, affect normal/bright, judgement and insight intact, normal speech and appearance well-groomed  CARDIOVASCULAR: Neck veins not appreciated indicating probable normal JVP. No LE edema. Normal skin appearance, no stasis dermatitis.     Recent Lab Results:  LIPID RESULTS:  Lab Results   Component Value Date    CHOL 151 10/09/2018    HDL 67 10/09/2018    LDL 53 10/09/2018    TRIG 158 (H) 10/09/2018       LIVER ENZYME RESULTS:  Lab Results   Component Value Date    AST 18 10/09/2018    ALT 18 10/09/2018       CBC RESULTS:  Lab Results   Component Value Date    WBC 3.8 (L) 10/09/2018    RBC 3.69 (L) 10/09/2018    HGB 11.8 10/09/2018    HCT 36.3 10/09/2018    MCV 98 10/09/2018    MCH 32.0 10/09/2018    MCHC 32.5 10/09/2018    RDW 11.1 10/09/2018     10/09/2018       BMP RESULTS:  Lab Results   Component Value Date     10/09/2018    POTASSIUM 3.9 10/09/2018    CHLORIDE 106 10/09/2018    CO2 26 10/09/2018    ANIONGAP 6 10/09/2018    GLC 92 10/09/2018    BUN 11 10/09/2018    CR 0.88 10/09/2018    GFRESTIMATED 80 10/09/2018    GFRESTBLACK >90 10/09/2018    PERLITA 8.9 10/09/2018        A1C RESULTS:  No results found for: A1C    INR RESULTS:  No results found for: INR        CC  Wiley Verma MD  [facility]  [address]  [place] [address]      Video-Visit Details    Type of service:  Video Visit    Video Time: 12 minutes    Originating Location (pt.  Location): home     Distant Location (provider location):  [facility] [place]     Platform used for Video Visit: Astrid

## 2020-11-29 ENCOUNTER — HEALTH MAINTENANCE LETTER (OUTPATIENT)
Age: 25
End: 2020-11-29

## 2021-09-19 ENCOUNTER — HEALTH MAINTENANCE LETTER (OUTPATIENT)
Age: 26
End: 2021-09-19

## 2022-01-09 ENCOUNTER — HEALTH MAINTENANCE LETTER (OUTPATIENT)
Age: 27
End: 2022-01-09

## 2022-11-21 ENCOUNTER — HEALTH MAINTENANCE LETTER (OUTPATIENT)
Age: 27
End: 2022-11-21

## 2023-04-16 ENCOUNTER — HEALTH MAINTENANCE LETTER (OUTPATIENT)
Age: 28
End: 2023-04-16